# Patient Record
(demographics unavailable — no encounter records)

---

## 2025-04-15 NOTE — REASON FOR VISIT
[Post Op: _________] : a [unfilled] post op visit [FreeTextEntry1] : laparoscopic robotic right inguinal hernia repair and reduction of incarcerated small bowel on 4/2/25

## 2025-04-15 NOTE — HISTORY OF PRESENT ILLNESS
[de-identified] : Ms. Graham presents today for a postoperative visit 2 weeks s/p laparoscopic robotic right inguinal hernia repair and reduction of incarcerated small bowel on 4/2/25. Patient reports she is doing well but concerned about lump in surgical site. Denies severe pain, n/v, fevers, chest pain, sob or PO intolerance. Tolerating a regular diet well with no difficulty. Voiding, passing gas and having regular bowel movements. Resuming regular activities as tolerated.

## 2025-04-15 NOTE — ASSESSMENT
[FreeTextEntry1] : 80 year old female having a sable postoperative course 2 weeks s/p right inguinal hernia repair. She is recovering well. No hernia reoccurrence was palpated. She has a fluid filled seroma in the right inguinal area. Explained to patient she had a large hernia and incarcerated bowel, the body creates the seroma as a postoperative inflammatory response that over time the body will reabsorb. In addition, she is also currently on Plavix, it's possible her body may also create a small pocket of blood that will also be reabsorbed. Advised patient to wear tight fitting underwear or spanks that can help the body reabsorb the fluid and follow up in one month to reassess. She may resume regular activities with no restriction.

## 2025-04-15 NOTE — PHYSICAL EXAM
[Normal Breath Sounds] : Normal breath sounds [Normal Heart Sounds] : normal heart sounds [Normal Rate and Rhythm] : normal rate and rhythm [No Rash or Lesion] : No rash or lesion [Alert] : alert [Oriented to Person] : oriented to person [Oriented to Place] : oriented to place [Oriented to Time] : oriented to time [Calm] : calm [de-identified] : well appearing, in no acute distress [de-identified] : 3 healing lap sites with dermabond, no signs of infection. Soft, non tender to palpation. Firm, movable seroma palpated on right inguinal region, no hernia reoccurrence was palpated [de-identified] : wnl

## 2025-04-17 NOTE — PHYSICAL EXAM
[Respiratory Effort] : normal respiratory effort [Normal Heart Sounds] : normal heart sounds [2+] : left 2+ [Abdomen Tenderness] : ~T ~M No abdominal tenderness [Alert] : alert [Calm] : calm [de-identified] : WN/WD, NAD [de-identified] : NC/AT [de-identified] : FROM

## 2025-04-17 NOTE — PHYSICAL EXAM
[Respiratory Effort] : normal respiratory effort [Normal Heart Sounds] : normal heart sounds [2+] : left 2+ [Abdomen Tenderness] : ~T ~M No abdominal tenderness [Alert] : alert [Calm] : calm [de-identified] : WN/WD, NAD [de-identified] : NC/AT [de-identified] : FROM

## 2025-04-17 NOTE — ASSESSMENT
[FreeTextEntry1] : 80yoF, former smoker (quit 7 years ago), PMHx COPD, PAD, 4.2cm AAA presents for an evaluation. [Aneurysm Surgery] : aneurysm surgery

## 2025-04-17 NOTE — HISTORY OF PRESENT ILLNESS
[FreeTextEntry1] : 80yoF, former smoker (quit 7 years ago), PMHx COPD, PAD presented to ED c/o R groin hernia and worsening pain x2 days. pt found to have R inguinal hernia for which general surgery was consulted for. CTAP performed and found to have abdominal aortic aneurysm with mural thrombus, maximal sac diameter 4.2 cm. Pt denies: chest pain, shortness of breath, abdominal pain. pt states she is able to ambulate approximately 6 [address] blocks without fatigue. vascular surgery consulted for AAA.

## 2025-04-17 NOTE — PHYSICAL EXAM
[Respiratory Effort] : normal respiratory effort [Normal Heart Sounds] : normal heart sounds [2+] : left 2+ [Abdomen Tenderness] : ~T ~M No abdominal tenderness [Alert] : alert [Calm] : calm [de-identified] : WN/WD, NAD [de-identified] : NC/AT [de-identified] : FROM

## 2025-04-17 NOTE — ADDENDUM
[FreeTextEntry1] : This note was written by Leanna GUTIERREZ, acting as a scribe for Dr. Corey Muñoz.  I, Dr. Corey Muñoz, have read and attest that all the information, medical decision-making, and discharge instructions within are true and accurate.  I, Dr. Corey Muñoz, personally performed the evaluation and management (E/M) services for this new patient.  That E/M includes conducting the initial examination, assessing all conditions, and establishing the plan of care.  Today, my ACP, Leanna GUTIERREZ, was here to observe my evaluation and management services for this patient to be followed going forward.

## 2025-04-23 NOTE — HISTORY OF PRESENT ILLNESS
[TextBox_4] : Pt is a 81 y/o female with medical history of COPD (not on O2), PAD on plavix, HTN, s/p R inguinal hernia repair     Presented to [facility] ED on 4/2/25  acute on chronic sob x 3 days. Sob not bothering her that badly, she was here today for gen surg appt so she decided to come to ED to be evaluated

## 2025-04-24 NOTE — HISTORY OF PRESENT ILLNESS
[FreeTextEntry1] : pt is here for follow up. [de-identified] : 81 y/o F w/ PMHx of HTN, COPD, PAD on plavix recently hospitalized at [facility] 4/1-4/3 for Rt inguinal hernia s/p repair w/ incidental find of triple AAA. Also recently presented to  ED on 4/17 for SOB 2/2 COPD exacerbation, never admitted. Saw general surgeon for outpatient follow up and wa told she was healing well. Saw pulm on 4/24 prior to this visit and was given a nebulizer machine.

## 2025-04-24 NOTE — HEALTH RISK ASSESSMENT
[0] : 2) Feeling down, depressed, or hopeless: Not at all (0) [Former] : Former [de-identified] : stopped about 7yrs ago.

## 2025-04-24 NOTE — REVIEW OF SYSTEMS
[Negative] : Neurological [Fever] : no fever [Chills] : no chills [Fatigue] : no fatigue [Wheezing] : no wheezing [Cough] : no cough [Abdominal Pain] : no abdominal pain [Nausea] : no nausea [Diarrhea] : diarrhea [Vomiting] : no vomiting [FreeTextEntry6] : Feels mild SOB  [FreeTextEntry7] : Lump where hernia was present.

## 2025-04-24 NOTE — PHYSICAL EXAM
[No Acute Distress] : no acute distress [Well Nourished] : well nourished [Well Developed] : well developed [No Edema] : there was no peripheral edema [Normal] : no rash [de-identified] : 3 well healed surgical incision sites. w/o erythema, tenderness, or drainage.

## 2025-04-25 NOTE — END OF VISIT
[] : Resident [FreeTextEntry3] : Pt seen with Dr. Hudson.  Was seen here yesterday, started amlodipine 2.5 BP better today Advised adherence to BP medication and discussed side effects, all questions answered/

## 2025-04-25 NOTE — HISTORY OF PRESENT ILLNESS
[FreeTextEntry1] : Follow up [de-identified] : 81 y/o F w/ PMHx of HTN, COPD, PAD on plavix recently hospitalized at [facility] 4/1-4/3 for Rt inguinal hernia s/p repair w/ incidental find of triple AAA. Also recently presented to  ED on 4/17 for SOB 2/2 COPD exacerbation, never admitted. Saw general surgeon for outpatient follow up and wa told she was healing well. Saw pulm on 4/24 prior to this visit and was given a nebulizer machine.   She is here today for re checking her BP following the introduction of Amlodipine 2.5. She has also been complaining of left sided intermittent flank pain that has been on and off. She has had a CT A/P and [facility] recently with no concerns for stones. She still has the post operative seroma which is healing well. She is also requesting a urogynecology referral for the complaint of bubbles in urine.

## 2025-04-25 NOTE — ASSESSMENT
[FreeTextEntry1] : 81 y/o F w/ PMHx of HTN, COPD, PAD on plavix recently hospitalized at [facility] 4/1-4/3 for Rt inguinal hernia s/p repair w/ incidental find of triple AAA. Here for BP re check following introduction of Amlodipine 2.5, responding well.

## 2025-04-25 NOTE — PLAN
[FreeTextEntry1] : # HTN - CW Amlodipine and HCZ  # Bubbles in urine - The patient describes intermittent appearance of bubbles in her urine. She has already urinated by the time she reported the symptoms - Urogynecology referral, will do urine studies there   # Post operative seroma - Watchful waiting for resolution   # Left sided flank pain - Lidocaine patch  # HCM - RTC in 3 months for follow up   Case discussed with Dr Connolly

## 2025-04-25 NOTE — PHYSICAL EXAM
[No Acute Distress] : no acute distress [Well Nourished] : well nourished [Well Developed] : well developed [Well-Appearing] : well-appearing [No JVD] : no jugular venous distention [No Lymphadenopathy] : no lymphadenopathy [Supple] : supple [Thyroid Normal, No Nodules] : the thyroid was normal and there were no nodules present [No Respiratory Distress] : no respiratory distress  [No Accessory Muscle Use] : no accessory muscle use [Clear to Auscultation] : lungs were clear to auscultation bilaterally [Normal Rate] : normal rate  [Regular Rhythm] : with a regular rhythm [Normal S1, S2] : normal S1 and S2 [No Murmur] : no murmur heard [Soft] : abdomen soft [Non Tender] : non-tender [Non-distended] : non-distended [No Masses] : no abdominal mass palpated [No CVA Tenderness] : no CVA  tenderness [No Spinal Tenderness] : no spinal tenderness [No Rash] : no rash [Normal Gait] : normal gait [Normal Affect] : the affect was normal [Normal Insight/Judgement] : insight and judgment were intact

## 2025-05-01 NOTE — END OF VISIT
[] : Resident [FreeTextEntry3] : 80F w/HTN, AAA, COPD, PAD, inguinal hernia repair here for f/u.  HTN - c/w amlodipine 2.5mg, RTC for BP check with RN this week and CECILLE next week. COPD - c/w anoroa, f/u pulm, has nebulizer, rx sent for duoneb BID

## 2025-05-01 NOTE — HISTORY OF PRESENT ILLNESS
[FreeTextEntry1] : pt is here for follow up. [de-identified] : 79 y/o F w/ PMHx of HTN, COPD, PAD on plavix recently hospitalized at [facility] 4/1-4/3 for Rt inguinal hernia s/p repair w/ incidental finding of triple AAA. Had hernia repair 4/1 with residual pain and palpable lump.  Worried about new BP med not checking at home currently. Started taking amlodipine 3 days ago with intermittent lightheadedness. Had a BP cuff but returned it as she thought she could take it with a fingerprint on her phone. Advised to repurchase and keep a paper log. Using anoro ellipta nebs but still having SOB. Not on other inhalers or using duonebs currently.

## 2025-05-01 NOTE — REVIEW OF SYSTEMS
[Shortness Of Breath] : shortness of breath [Negative] : Gastrointestinal [Wheezing] : no wheezing [Cough] : no cough

## 2025-05-01 NOTE — HISTORY OF PRESENT ILLNESS
[FreeTextEntry1] : pt is here for follow up. [de-identified] : 81 y/o F w/ PMHx of HTN, COPD, PAD on plavix recently hospitalized at [facility] 4/1-4/3 for Rt inguinal hernia s/p repair w/ incidental finding of triple AAA. Had hernia repair 4/1 with residual pain and palpable lump.  Worried about new BP med not checking at home currently. Started taking amlodipine 3 days ago with intermittent lightheadedness. Had a BP cuff but returned it as she thought she could take it with a fingerprint on her phone. Advised to repurchase and keep a paper log. Using anoro ellipta nebs but still having SOB. Not on other inhalers or using duonebs currently.

## 2025-05-13 NOTE — ASSESSMENT
[FreeTextEntry1] : 79 yo Former smoker, presumptive COPD, referred to establish care.   Data Reviewed:  Labs 4/2025: no eos on CBC diff  CXR 4/2025: normal   COPD history as per patient's reports and prescribed inhaler. Will get PFTs and 6MWT to assess severity. Based on history, appears to have bronchitic phenotype. There was no sign of emphysema on her CXR or lung portions of her CT abdomen.  Would c/w Anoro for now and duonebs PRN; although seems to have some trouble with anoro use. She would like to continue treatment for ARIA. Will reach out to Dr. Cortés's office to get HST or PSG results in order to try to get new equipment. Old cpap/apap may have been left/lost in the office.    OV after testing completed.

## 2025-05-13 NOTE — HISTORY OF PRESENT ILLNESS
[Former] : former [>= 20 pack years] : >= 20 pack years [Never] : never [TextBox_4] : 80 yof  referred for evaluation of persistent dyspnea.   Former smoker, nearly 50 pack years quit ten years ago. No history of respiratory failure or dyspnea prior to ER visit approximately 2 months ago. In the ED she was treated for presumptive COPD exacerbation and discharged with outpatient follow up on anoro. Symptoms have persisted despite inhaler use, was prescribed nebulizer by PCP which has been occasionally helpful.  No history of asthma or personal or family history of parenchymal lung issues. 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 No hospitalizations for respiratory failure and has never needed systemic steroids. No allergies, no pets. No atopic symptoms. Has had chronic cough for years.  Medication list reviewed.  Does not want to talk about vaccinations.  Followed with pulmonologist at [place] (Dr. Scott Cortés: [phone]), but since incident with cpap, she has not been unable to make appointment. Reportedly has ARIA on CPAP, brought cpap for mask fitting to office rather than to company as instructed by company rep over the phone. Reports machine was taken from her for inspection and she never got it back. [TextBox_11] : 1 [TextBox_13] : 50 [YearQuit] : 2015

## 2025-05-13 NOTE — PHYSICAL EXAM
[No Acute Distress] : no acute distress [Normal Oropharynx] : normal oropharynx [Normal Appearance] : normal appearance [Normal Rate/Rhythm] : normal rate/rhythm [Normal S1, S2] : normal s1, s2 [No Murmurs] : no murmurs [No Resp Distress] : no resp distress [Clear to Auscultation Bilaterally] : clear to auscultation bilaterally [No Abnormalities] : no abnormalities [No Clubbing] : no clubbing [No Edema] : no edema [Normal Color/ Pigmentation] : normal color/ pigmentation [Oriented x3] : oriented x3 [Normal Affect] : normal affect [Well Groomed] : well groomed [Supple] : supple [Soft] : soft [TextBox_44] : no cervical or supraclavicular lad [TextBox_105] : arthritic changes of digits

## 2025-05-13 NOTE — END OF VISIT
[] : Fellow [FreeTextEntry3] : I, personally performed the evaluation and management (E/M) services for this new patient.  That E/M includes conducting the initial examination, assessing all conditions, and establishing the plan of care.  Today, my fellow was here to observe and participate on evaluation and management services for this patient to be followed going forward.      [Time Spent: ___ minutes] : I have spent [unfilled] minutes of time on the encounter which excludes teaching and separately reported services.

## 2025-05-13 NOTE — ASSESSMENT
[FreeTextEntry1] : 81 yo Former smoker, presumptive COPD, referred to establish care.   Data Reviewed:  Labs 4/2025: no eos on CBC diff  CXR 4/2025: normal   COPD history as per patient's reports and prescribed inhaler. Will get PFTs and 6MWT to assess severity. Based on history, appears to have bronchitic phenotype. There was no sign of emphysema on her CXR or lung portions of her CT abdomen.  Would c/w Anoro for now and duonebs PRN; although seems to have some trouble with anoro use. She would like to continue treatment for ARIA. Will reach out to Dr. Cortés's office to get HST or PSG results in order to try to get new equipment. Old cpap/apap may have been left/lost in the office.    OV after testing completed.

## 2025-05-13 NOTE — HISTORY OF PRESENT ILLNESS
[Former] : former [>= 20 pack years] : >= 20 pack years [Never] : never [TextBox_4] : 80 yof  referred for evaluation of persistent dyspnea.   Former smoker, nearly 50 pack years quit ten years ago. No history of respiratory failure or dyspnea prior to ER visit approximately 2 months ago. In the ED she was treated for presumptive COPD exacerbation and discharged with outpatient follow up on anoro. Symptoms have persisted despite inhaler use, was prescribed nebulizer by PCP which has been occasionally helpful.  No history of asthma or personal or family history of parenchymal lung issues. 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 No hospitalizations for respiratory failure and has never needed systemic steroids. No allergies, no pets. No atopic symptoms. Has had chronic cough for years.  Medication list reviewed.  Does not want to talk about vaccinations.  Followed with pulmonologist at [place] (Dr. Scott Cortés: [phone]), but since incident with cpap, she has not been unable to make appointment. Reportedly has ARIA on CPAP, brought cpap for mask fitting to office rather than to company as instructed by company rep over the phone. Reports machine was taken from her for inspection and she never got it back. [TextBox_11] : 1 [TextBox_13] : 50 [YearQuit] : 2015

## 2025-05-13 NOTE — REVIEW OF SYSTEMS
[Cough] : cough [Dyspnea] : dyspnea [SOB on Exertion] : sob on exertion [Negative] : Psychiatric [Fever] : no fever [Chills] : no chills [Recent Wt Loss (___ Lbs)] : ~T no recent weight loss [Sputum] : no sputum [Wheezing] : no wheezing [Seasonal Allergies] : no seasonal allergies [GERD] : no gerd [Arthralgias] : arthralgias [Rash] : no rash

## 2025-05-14 NOTE — PHYSICAL EXAM
[de-identified] : 3 small well-healed surgical scars noted to lower abdomen [de-identified] : + 1 ecchymotic spot noted to L forearm, R forearm, L hand, R hand, L anterior thigh and R lateral and medial thigh

## 2025-05-14 NOTE — HISTORY OF PRESENT ILLNESS
[FreeTextEntry1] : Pt here for follow up [de-identified] : 80-year-old female with HTN, COPD, PAD with LE stent (on Plavix for many years), recent R sided inguinal hernia repair (4/1/25 at [facility]) and incidental finding of infrarenal AAA; now presenting for follow up. Pt reports that after starting amlodipine during last visit, she noticed bruising to her bilateral UE and LE without any falls or injuries. Pt also states that 2.5 weeks ago, she felt a sensation of "gush of blood flow" down her lower extremities that lasted for one minute and resolved on its own; denies any lightheadedness, dizziness, headache, weakness, numbness or tingling during the episode. Pt states her BP has been ranging in 130s/80s at home. Pt mentions that she followed up with pulm on 5/12 and was referred to a sleep specialist to get a sleep study done.

## 2025-05-14 NOTE — HEALTH RISK ASSESSMENT
[Little interest or pleasure doing things] : 1) Little interest or pleasure doing things [Feeling down, depressed, or hopeless] : 2) Feeling down, depressed, or hopeless [0] : 2) Feeling down, depressed, or hopeless: Not at all (0) [PHQ-9 Negative - No further assessment needed] : PHQ-9 Negative - No further assessment needed [Former] : Former [[id number]] : 0

## 2025-05-14 NOTE — END OF VISIT
[] : Resident [FreeTextEntry3] : Amlodipine did not agree with her e.g. flushing sensation, bruising, not feeling herself.  Pt reports that one-time high BP was in the setting of extreme emotional stress.  BP range permissible to try a different class, low dose. Instructions given: Monitor BP on HCTZ only. If BP consistently above 140 SBP, or 90DBP, please start lisinopril 5mg DAILY, and f/u within 2-4 wks for blood work.  Pt verbalized understanding of the plan.

## 2025-05-14 NOTE — HEALTH RISK ASSESSMENT
[Little interest or pleasure doing things] : 1) Little interest or pleasure doing things [Feeling down, depressed, or hopeless] : 2) Feeling down, depressed, or hopeless [0] : 2) Feeling down, depressed, or hopeless: Not at all (0) [PHQ-9 Negative - No further assessment needed] : PHQ-9 Negative - No further assessment needed [Former] : Former [[id number]] : 0

## 2025-05-14 NOTE — PHYSICAL EXAM
[de-identified] : 3 small well-healed surgical scars noted to lower abdomen [de-identified] : + 1 ecchymotic spot noted to L forearm, R forearm, L hand, R hand, L anterior thigh and R lateral and medial thigh

## 2025-05-14 NOTE — HISTORY OF PRESENT ILLNESS
[FreeTextEntry1] : Pt here for follow up [de-identified] : 80-year-old female with HTN, COPD, PAD with LE stent (on Plavix for many years), recent R sided inguinal hernia repair (4/1/25 at [facility]) and incidental finding of infrarenal AAA; now presenting for follow up. Pt reports that after starting amlodipine during last visit, she noticed bruising to her bilateral UE and LE without any falls or injuries. Pt also states that 2.5 weeks ago, she felt a sensation of "gush of blood flow" down her lower extremities that lasted for one minute and resolved on its own; denies any lightheadedness, dizziness, headache, weakness, numbness or tingling during the episode. Pt states her BP has been ranging in 130s/80s at home. Pt mentions that she followed up with pulm on 5/12 and was referred to a sleep specialist to get a sleep study done.

## 2025-05-14 NOTE — REVIEW OF SYSTEMS
[Fever] : no fever [Chills] : no chills [Fatigue] : no fatigue [Earache] : no earache [Nasal Discharge] : no nasal discharge [Sore Throat] : no sore throat [Chest Pain] : no chest pain [Palpitations] : no palpitations [Lower Ext Edema] : no lower extremity edema [Wheezing] : no wheezing [Cough] : no cough [Abdominal Pain] : no abdominal pain [Nausea] : no nausea [Constipation] : no constipation [Vomiting] : no vomiting [Dysuria] : no dysuria [Hematuria] : no hematuria [Frequency] : no frequency [Itching] : no itching [Skin Rash] : no skin rash

## 2025-05-19 NOTE — HISTORY OF PRESENT ILLNESS
[FreeTextEntry1] : pt here for follow up [de-identified] : 80-year-old female with HTN, COPD, PAD with LE stent (on Plavix for many years), recent R sided inguinal hernia repair (4/1/25 at [facility]) and incidental finding of infrarenal AAA; now presenting for follow up of blood tests that were done on 5/13. She cont to complain of bruising on her skin and feeling hot. She has stopped taking amlodipine as instructed. She has also stopped taking HCTZ due to feeling dehydrated.

## 2025-05-19 NOTE — HEALTH RISK ASSESSMENT
[Little interest or pleasure doing things] : 1) Little interest or pleasure doing things [Feeling down, depressed, or hopeless] : 2) Feeling down, depressed, or hopeless [0] : 2) Feeling down, depressed, or hopeless: Not at all (0) [PHQ-9 Negative - No further assessment needed] : PHQ-9 Negative - No further assessment needed [Former] : Former [[id number]] : 0

## 2025-05-19 NOTE — HISTORY OF PRESENT ILLNESS
[FreeTextEntry1] : pt here for follow up [de-identified] : 80-year-old female with HTN, COPD, PAD with LE stent (on Plavix for many years), recent R sided inguinal hernia repair (4/1/25 at [facility]) and incidental finding of infrarenal AAA; now presenting for follow up of blood tests that were done on 5/13. She cont to complain of bruising on her skin and feeling hot. She has stopped taking amlodipine as instructed. She has also stopped taking HCTZ due to feeling dehydrated.

## 2025-05-19 NOTE — END OF VISIT
[] : Resident [FreeTextEntry3] : #PAD- f/u cards/vascular  #HTN- been off hctz and amlodipine. and then started on lisinopril 5mg- home bps 120s/80s. recehck bmp for lisinopril today before getting mri for pancreas but she declined because she felt overwhelmed today and wants to do bmp for kidney function tomorrow  #aneurysm- needs to f/u vascular/cardiology #dilation of cbd - get mrcp  [Time Spent: ___ minutes] : I have spent [unfilled] minutes of time on the encounter which excludes teaching and separately reported services.

## 2025-05-19 NOTE — PHYSICAL EXAM
[No Acute Distress] : no acute distress [Normal Sclera/Conjunctiva] : normal sclera/conjunctiva [PERRL] : pupils equal round and reactive to light [EOMI] : extraocular movements intact [No JVD] : no jugular venous distention [No Lymphadenopathy] : no lymphadenopathy [Supple] : supple [Thyroid Normal, No Nodules] : the thyroid was normal and there were no nodules present [No Respiratory Distress] : no respiratory distress  [No Accessory Muscle Use] : no accessory muscle use [Clear to Auscultation] : lungs were clear to auscultation bilaterally [Normal Rate] : normal rate  [Regular Rhythm] : with a regular rhythm [Normal S1, S2] : normal S1 and S2 [No Murmur] : no murmur heard [No Edema] : there was no peripheral edema [Soft] : abdomen soft [Non Tender] : non-tender [Non-distended] : non-distended [No Masses] : no abdominal mass palpated [No HSM] : no HSM [No Joint Swelling] : no joint swelling [Grossly Normal Strength/Tone] : grossly normal strength/tone [No Rash] : no rash [Coordination Grossly Intact] : coordination grossly intact [No Focal Deficits] : no focal deficits

## 2025-05-19 NOTE — ASSESSMENT
[FreeTextEntry1] : Discussed the rest of the labs with the patient. CMP w/ K 3.4 and eGFR 48.  on statin. TSH WNL, Vit D WNL, A1c prediabetic, no albuminuria, CBC WNL. Patient reassured  Plan discussed w/ Dr Shabazz  RTC PRN

## 2025-05-19 NOTE — REVIEW OF SYSTEMS
[Easy Bruising] : easy bruising [Fever] : no fever [Chills] : no chills [Chest Pain] : no chest pain [Palpitations] : no palpitations [Lower Ext Edema] : no lower extremity edema [Shortness Of Breath] : no shortness of breath [Wheezing] : no wheezing [Cough] : no cough [Dyspnea on Exertion] : no dyspnea on exertion [Abdominal Pain] : no abdominal pain [Nausea] : no nausea [Constipation] : no constipation [Diarrhea] : diarrhea [Vomiting] : no vomiting [Dysuria] : no dysuria [Hematuria] : no hematuria [Joint Pain] : no joint pain [Skin Rash] : no skin rash [Headache] : no headache [Dizziness] : no dizziness

## 2025-05-19 NOTE — HEALTH RISK ASSESSMENT
[Little interest or pleasure doing things] : 1) Little interest or pleasure doing things [Feeling down, depressed, or hopeless] : 2) Feeling down, depressed, or hopeless [0] : 2) Feeling down, depressed, or hopeless: Not at all (0) [PHQ-9 Negative - No further assessment needed] : PHQ-9 Negative - No further assessment needed [Former] : Former [[id number]] : 0